# Patient Record
Sex: FEMALE | Race: WHITE | URBAN - METROPOLITAN AREA
[De-identification: names, ages, dates, MRNs, and addresses within clinical notes are randomized per-mention and may not be internally consistent; named-entity substitution may affect disease eponyms.]

---

## 2025-05-02 ENCOUNTER — OFFICE VISIT (OUTPATIENT)
Dept: URGENT CARE | Age: 60
End: 2025-05-02
Payer: COMMERCIAL

## 2025-05-02 VITALS
WEIGHT: 130 LBS | DIASTOLIC BLOOD PRESSURE: 66 MMHG | BODY MASS INDEX: 23.92 KG/M2 | HEIGHT: 62 IN | RESPIRATION RATE: 16 BRPM | OXYGEN SATURATION: 98 % | HEART RATE: 71 BPM | SYSTOLIC BLOOD PRESSURE: 129 MMHG | TEMPERATURE: 98.6 F
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
## 2025-05-02 DIAGNOSIS — S61.451A DOG BITE OF RIGHT HAND, INITIAL ENCOUNTER: Primary | ICD-10-CM

## 2025-05-02 PROCEDURE — 1036F TOBACCO NON-USER: CPT | Performed by: FAMILY MEDICINE

## 2025-05-02 PROCEDURE — 99213 OFFICE O/P EST LOW 20 MIN: CPT | Performed by: FAMILY MEDICINE

## 2025-05-02 PROCEDURE — 3008F BODY MASS INDEX DOCD: CPT | Performed by: FAMILY MEDICINE

## 2025-05-02 RX ORDER — MUPIROCIN 20 MG/G
OINTMENT TOPICAL 2 TIMES DAILY
Qty: 22 G | Refills: 0 | Status: SHIPPED | OUTPATIENT
Start: 2025-05-02 | End: 2025-05-09

## 2025-05-02 ASSESSMENT — PAIN SCALES - GENERAL: PAINLEVEL_OUTOF10: 4

## 2025-05-02 NOTE — PROGRESS NOTES
"Subjective   Patient ID: Tran Cameron is a 59 y.o. female. They present today with a chief complaint of Animal Bite (About a half hour ago on right hand).    History of Present Illness  HPI  Patient presents with a dog bite on her right hand approximately 30 minutes ago.  She states the dog  owned by her sister.  Dog's vaccines are up-to-date.  Wounds are shallow and bleeding mildly.  Last tetanus was 7 years ago  Past Medical History  Allergies as of 05/02/2025 - never reviewed   Allergen Reaction Noted    Penicillins Unknown 08/04/2003       Prescriptions Prior to Admission[1]     Medical History[2]    Surgical History[3]     reports that she has never smoked. She has never used smokeless tobacco.    Review of Systems  Review of Systems     As in history of present illness                          Objective    Vitals:    05/02/25 1955   Pulse: 71   Resp: 16   TempSrc: Oral   SpO2: 98%   Weight: 59 kg (130 lb)   Height: 1.575 m (5' 2\")     No LMP recorded. Patient is postmenopausal.    Physical Exam  General: Vitals noted, no distress. Afebrile.   Vascular: Extremity warm and dry with good peripheral pulses noted  Extremities: No peripheral edema.  Movement in right hand and fingers intact  Skin: No rash.  Several small shallow wounds are noted on dorsum of right hand with slight bleeding.  No surrounding swelling or bruising noted  Neuro: No focal neurologic deficits, NIH score of 0.    Procedures    Point of Care Test & Imaging Results from this visit  No results found for this visit on 05/02/25.   Imaging  No results found.    Cardiology, Vascular, and Other Imaging  No other imaging results found for the past 2 days      Diagnostic study results (if any) were reviewed by Noe Mariscal MD.    Assessment/Plan   Allergies, medications, history, and pertinent labs/EKGs/Imaging reviewed by Noe Mariscal MD.     Medical Decision Making  At time of discharge patient was clinically well-appearing and HDS for " outpatient management. The patient and/or family was educated regarding diagnosis, supportive care, OTC and Rx medications. The patient and/or family was given the opportunity to ask questions prior to discharge.  They verbalized understanding of my discussion of the plans for treatment, expected course, indications to return to  or seek further evaluation in ED, and the need for timely follow up as directed.   They were provided with a work/school excuse if requested.    Orders and Diagnoses  There are no diagnoses linked to this encounter.    Medical Admin Record      Patient disposition: Home    Electronically signed by Noe Mariscal MD  7:57 PM           [1] (Not in a hospital admission)  [2] No past medical history on file.  [3] No past surgical history on file.

## 2025-05-03 NOTE — PATIENT INSTRUCTIONS
Clean with gentle soap and water twice a day and apply apply ointment after drying  Ointment can be applied twice a day for 1 week to wounds  Tetanus up-to-date per chart  Follow-up for signs of infection as discussed